# Patient Record
Sex: MALE | Race: WHITE
[De-identification: names, ages, dates, MRNs, and addresses within clinical notes are randomized per-mention and may not be internally consistent; named-entity substitution may affect disease eponyms.]

---

## 2020-04-18 ENCOUNTER — HOSPITAL ENCOUNTER (EMERGENCY)
Dept: HOSPITAL 63 - ER | Age: 60
Discharge: HOME | End: 2020-04-18
Payer: MEDICAID

## 2020-04-18 VITALS — SYSTOLIC BLOOD PRESSURE: 139 MMHG | DIASTOLIC BLOOD PRESSURE: 78 MMHG

## 2020-04-18 VITALS — HEIGHT: 68 IN | BODY MASS INDEX: 31.64 KG/M2 | WEIGHT: 208.78 LBS

## 2020-04-18 DIAGNOSIS — F41.8: ICD-10-CM

## 2020-04-18 DIAGNOSIS — F10.229: ICD-10-CM

## 2020-04-18 DIAGNOSIS — R06.02: ICD-10-CM

## 2020-04-18 DIAGNOSIS — E87.6: Primary | ICD-10-CM

## 2020-04-18 LAB
ALBUMIN SERPL-MCNC: 4 G/DL (ref 3.4–5)
ALBUMIN/GLOB SERPL: 1.4 {RATIO} (ref 1–1.7)
ALP SERPL-CCNC: 70 U/L (ref 46–116)
ALT SERPL-CCNC: 35 U/L (ref 16–63)
ANION GAP SERPL CALC-SCNC: 13 MMOL/L (ref 6–14)
AST SERPL-CCNC: 32 U/L (ref 15–37)
BASOPHILS # BLD AUTO: 0 X10^3/UL (ref 0–0.2)
BASOPHILS NFR BLD: 0 % (ref 0–3)
BILIRUB SERPL-MCNC: 0.6 MG/DL (ref 0.2–1)
BUN/CREAT SERPL: 20 (ref 6–20)
CA-I SERPL ISE-MCNC: 18 MG/DL (ref 8–26)
CALCIUM SERPL-MCNC: 7.9 MG/DL (ref 8.5–10.1)
CHLORIDE SERPL-SCNC: 108 MMOL/L (ref 98–107)
CO2 SERPL-SCNC: 22 MMOL/L (ref 21–32)
CREAT SERPL-MCNC: 0.9 MG/DL (ref 0.7–1.3)
EOSINOPHIL NFR BLD: 0.6 X10^3/UL (ref 0–0.7)
EOSINOPHIL NFR BLD: 7 % (ref 0–3)
ERYTHROCYTE [DISTWIDTH] IN BLOOD BY AUTOMATED COUNT: 14.1 % (ref 11.5–14.5)
GFR SERPLBLD BASED ON 1.73 SQ M-ARVRAT: 85.8 ML/MIN
GLOBULIN SER-MCNC: 2.9 G/DL (ref 2.2–3.8)
GLUCOSE SERPL-MCNC: 86 MG/DL (ref 70–99)
HCT VFR BLD CALC: 42.2 % (ref 39–53)
HGB BLD-MCNC: 14.7 G/DL (ref 13–17.5)
LYMPHOCYTES # BLD: 2.5 X10^3/UL (ref 1–4.8)
LYMPHOCYTES NFR BLD AUTO: 30 % (ref 24–48)
MCH RBC QN AUTO: 34 PG (ref 25–35)
MCHC RBC AUTO-ENTMCNC: 35 G/DL (ref 31–37)
MCV RBC AUTO: 97 FL (ref 79–100)
MONO #: 0.5 X10^3/UL (ref 0–1.1)
MONOCYTES NFR BLD: 6 % (ref 0–9)
NEUT #: 4.6 X10^3UL (ref 1.8–7.7)
NEUTROPHILS NFR BLD AUTO: 57 % (ref 31–73)
PLATELET # BLD AUTO: 196 X10^3/UL (ref 140–400)
POTASSIUM SERPL-SCNC: 3 MMOL/L (ref 3.5–5.1)
PROT SERPL-MCNC: 6.9 G/DL (ref 6.4–8.2)
RBC # BLD AUTO: 4.36 X10^6/UL (ref 4.3–5.7)
SODIUM SERPL-SCNC: 143 MMOL/L (ref 136–145)
WBC # BLD AUTO: 8.2 X10^3/UL (ref 4–11)

## 2020-04-18 PROCEDURE — 36415 COLL VENOUS BLD VENIPUNCTURE: CPT

## 2020-04-18 PROCEDURE — 80053 COMPREHEN METABOLIC PANEL: CPT

## 2020-04-18 PROCEDURE — 85025 COMPLETE CBC W/AUTO DIFF WBC: CPT

## 2020-04-18 PROCEDURE — 71045 X-RAY EXAM CHEST 1 VIEW: CPT

## 2020-04-18 PROCEDURE — 99284 EMERGENCY DEPT VISIT MOD MDM: CPT

## 2020-04-18 NOTE — PHYS DOC
General Adult


EDM:


Chief Complaint:  DYSPNEA/RESPIRATOY DISTRESS





HPI:


HPI:


60-year-old male presents with shortness of breath.  Patient thinks maybe he had

a panic attack.  He called an ambulance and his brother because he was having 

dry heaves and he was afraid he was can have a seizure.  Both the patient and 

his brother who is also patient in the ER at this time have been drinking 

alcohol today.  The patient walked here, nearly a mile.  He was feeling short of

breath, but that has improved.  He denies fever chills.  He has no other 

complaints.





Review of Systems:


Review of Systems:


Constitutional:  Denies fever or chills 


Eyes:  Denies change in visual acuity 


HENT:  Denies nasal congestion or sore throat 


Respiratory:  shortness of breath 


Cardiovascular:  Denies chest pain or edema 


GI:  Denies abdominal pain, nausea, vomiting, bloody stools or diarrhea 


:  Denies dysuria 


Musculoskeletal:  Denies back pain or joint pain 


Integument:  Denies rash 


Neurologic:  Denies headache, focal weakness or sensory changes 


Endocrine:  Denies polyuria or polydipsia 


Lymphatic:  Denies swollen glands 


Psychiatric:  Denies depression or anxiety





Heart Score:


Risk Factors:


Risk Factors:  DM, Current or recent (<one month) smoker, HTN, HLP, family 

history of CAD, obesity.


Risk Scores:


Score 0 - 3:  2.5% MACE over next 6 weeks - Discharge Home


Score 4 - 6:  20.3% MACE over next 6 weeks - Admit for Clinical Observation


Score 7 - 10:  72.7% MACE over next 6 weeks - Early Invasive Strategies





Allergies:


Allergies:





Allergies








Coded Allergies Type Severity Reaction Last Updated Verified


 


  No Known Drug Allergies    4/18/20 No











Physical Exam:


PE:





Constitutional: Well developed, obese, well nourished, no acute distress, non-

toxic appearance. []


HENT: Normocephalic, atraumatic, bilateral external ears normal, oropharynx 

moist, no oral exudates, nose normal. []


Eyes: PERRLA, EOMI, conjunctiva normal, no discharge. [] 


Neck: Normal range of motion, no tenderness, supple, no stridor. [] 


Cardiovascular:Heart rate regular rhythm, no murmur []


Lungs & Thorax:  Bilateral breath sounds clear to auscultation []


Abdomen: Bowel sounds normal, soft, no tenderness, no masses, no pulsatile 

masses. [] 


Skin: Warm, dry, no erythema, no rash. [] 


Back: No tenderness, no CVA tenderness. [] 


Extremities: No tenderness, no cyanosis, no clubbing, ROM intact, no edema. [] 


Neurologic: Alert and oriented X 3, normal motor function, normal sensory 

function, no focal deficits noted. []


Psychologic: Affect normal, judgement normal, mood normal. []





EKG:


EKG:


[]





Radiology/Procedures:


Radiology/Procedures:


[]


Impressions:


EXAM: AP View of the chest


 


DATE: 4/18/2020 12:53 AM


 


INDICATION: Cough, shortness of breath


 


COMPARISON: No Prior


 


FINDINGS:


 


 


The heart is not enlarged.


 


Mediastinal and hilar contours are normal.


 


No focal parenchymal airspace opacity.


 


No pleural effusion or pneumothorax.


 


 


 


IMPRESSION:


1.  No radiographic evidence for acute cardiopulmonary process.


 


Electronically signed by: Luke Botello MD (4/18/2020 1:23 AM) [facility]














DICTATED AND SIGNED BY:     LUKE BOTELLO MD


DATE:     04/18/20 0123





CC: LUIS HERNANDEZ DO; PCP,NO ~





Course & Med Decision Making:


Course & Med Decision Making


Pertinent Labs and Imaging studies reviewed. (See chart for details)


The patient's chest x-ray is unremarkable.  His labs are unremarkable except for

 a potassium of 3.0.  We will give him 40 mEq of potassium replacement.  The 

patient is stable for discharge at this time.


[]





Dragon Disclaimer:


Dragon Disclaimer:


This electronic medical record was generated, in whole or in part, using a voice

 recognition dictation system.





Departure


Departure:


Impression:  


   Primary Impression:  


   Hypokalemia


   Additional Impression:  


   Alcohol intoxication


   Qualified Codes:  F10.920 - Alcohol use, unspecified with intoxication, 

   uncomplicated


Disposition:  01 HOME, SELF-CARE


Condition:  STABLE


Referrals:  


PCP,NO (PCP)


Patient Instructions:  Alcohol Intoxication, Easy-to-Read, Hypokalemia-Brief











LUIS HERNANDEZ DO                 Apr 18, 2020 01:00

## 2020-04-18 NOTE — RAD
EXAM: AP View of the chest

 

DATE: 4/18/2020 12:53 AM

 

INDICATION: Cough, shortness of breath

 

COMPARISON: No Prior

 

FINDINGS:

 

 

The heart is not enlarged.

 

Mediastinal and hilar contours are normal.

 

No focal parenchymal airspace opacity.

 

No pleural effusion or pneumothorax.

 

 

 

IMPRESSION:

1.  No radiographic evidence for acute cardiopulmonary process.

 

Electronically signed by: Luke Botello MD (4/18/2020 1:23 AM) [facility]